# Patient Record
Sex: FEMALE | Race: WHITE | RURAL
[De-identification: names, ages, dates, MRNs, and addresses within clinical notes are randomized per-mention and may not be internally consistent; named-entity substitution may affect disease eponyms.]

---

## 2019-07-08 ENCOUNTER — OFFICE VISIT (OUTPATIENT)
Dept: INTERNAL MEDICINE CLINIC | Age: 19
End: 2019-07-08

## 2019-07-08 VITALS
HEIGHT: 65 IN | TEMPERATURE: 98.1 F | BODY MASS INDEX: 18.73 KG/M2 | SYSTOLIC BLOOD PRESSURE: 102 MMHG | WEIGHT: 112.4 LBS | RESPIRATION RATE: 14 BRPM | OXYGEN SATURATION: 98 % | DIASTOLIC BLOOD PRESSURE: 73 MMHG | HEART RATE: 104 BPM

## 2019-07-08 DIAGNOSIS — F41.8 ANXIETY WITH DEPRESSION: Primary | ICD-10-CM

## 2019-07-08 DIAGNOSIS — F33.0 DEPRESSION, MAJOR, RECURRENT, MILD (HCC): ICD-10-CM

## 2019-07-08 RX ORDER — PAROXETINE HYDROCHLORIDE 40 MG/1
40 TABLET, FILM COATED ORAL DAILY
Qty: 30 TAB | Refills: 0 | Status: SHIPPED | OUTPATIENT
Start: 2019-07-08 | End: 2019-08-13 | Stop reason: SDUPTHER

## 2019-07-08 RX ORDER — PAROXETINE HYDROCHLORIDE 40 MG/1
40 TABLET, FILM COATED ORAL DAILY
Refills: 1 | COMMUNITY
Start: 2019-06-08 | End: 2019-07-08 | Stop reason: SDUPTHER

## 2019-07-08 RX ORDER — LEVONORGESTREL AND ETHINYL ESTRADIOL 0.1-0.02MG
KIT ORAL DAILY
COMMUNITY
Start: 2019-05-09

## 2019-07-08 NOTE — PROGRESS NOTES
Chief Complaint   Patient presents with    \[facility] [place] [facility]\"" Care    Depression     MEDICATION REFILL. PATIENT DENIES THERAPIST. DR. Shanda Beckham ([address] [place] [address])     HISTORY OF PRESENT ILLNESS  Kala Osgood is a 19 y.o. female who recently moved from [place], [place]. Her parents live here, who she said are supportive. She is an only child. Has friends and has a boyfriend. Worked as a CNA in the past. Looking currently for employment. Says it helps her symptoms to work. Depression  Has had depression since she was a teenager. Stated she also has anxiety. Been on Paxil 40 mg PO daily, which has helped her anxiety but she said \" I don't think it helps the depression. \" Requesting refill. Was being followed by Warrick Duverney ([address] [place] [address], [place].) Signed waiver form to obtain records. She said her water intake is good, but she drinks about 2-3 cups coffee daily. Tries to exercise 30 min day. Denied smoking, ETOH or recreational drugs. Said she has suicidal thoughts 3x per week. Denied currently having suicidal thoughts or being actively suicidal. Does not have a plan. Said she gets \" irritable\" and recently her life has been affected by her symptoms.      No Known Allergies  Patient Active Problem List   Diagnosis Code    Depression F32.9    Anxiety F41.9     Family History   Problem Relation Age of Onset    Cancer Mother         breast; anxiety and depression     Social History     Socioeconomic History    Marital status: SINGLE     Spouse name: Not on file    Number of children: Not on file    Years of education: Not on file    Highest education level: Not on file   Occupational History    Not on file   Social Needs    Financial resource strain: Not on file    Food insecurity:     Worry: Not on file     Inability: Not on file    Transportation needs:     Medical: Not on file     Non-medical: Not on file   Tobacco Use    Smoking status: Never Smoker    Smokeless tobacco: Never Used   Substance and Sexual Activity    Alcohol use: Never     Frequency: Never    Drug use: Not Currently    Sexual activity: Yes     Partners: Male   Lifestyle    Physical activity:     Days per week: Not on file     Minutes per session: Not on file    Stress: Not on file   Relationships    Social connections:     Talks on phone: Not on file     Gets together: Not on file     Attends [employer] service: Not on file     Active member of club or organization: Not on file     Attends meetings of clubs or organizations: Not on file     Relationship status: Not on file    Intimate partner violence:     Fear of current or ex partner: Not on file     Emotionally abused: Not on file     Physically abused: Not on file     Forced sexual activity: Not on file   Other Topics Concern    Not on file   Social History Narrative    Not on file     3 most recent PHQ Screens 7/9/2019   Little interest or pleasure in doing things Several days   Feeling down, depressed, irritable, or hopeless Several days   Total Score PHQ 2 2   Trouble falling or staying asleep, or sleeping too much Not at all   Feeling tired or having little energy Several days   Poor appetite, weight loss, or overeating Not at all   Feeling bad about yourself - or that you are a failure or have let yourself or your family down Not at all   Trouble concentrating on things such as school, work, reading, or watching TV Not at all   Moving or speaking so slowly that other people could have noticed; or the opposite being so fidgety that others notice Not at all   Thoughts of being better off dead, or hurting yourself in some way More than half the days   PHQ 9 Score 5     Review of Systems   Constitutional: Positive for malaise/fatigue. Negative for chills, diaphoresis, fever and weight loss. HENT: Negative for congestion, sinus pain and sore throat. Eyes: Negative for blurred vision.    Respiratory: Negative for cough, shortness of breath and wheezing. Cardiovascular: Negative for chest pain, palpitations and leg swelling. Gastrointestinal: Negative for abdominal pain, heartburn, nausea and vomiting. Musculoskeletal: Negative for back pain, joint pain and myalgias. Skin: Negative for rash. Neurological: Negative for dizziness, tingling, loss of consciousness, weakness and headaches. Endo/Heme/Allergies: Negative for environmental allergies. Psychiatric/Behavioral: Positive for depression. Negative for suicidal ideas (occasional but not currently. ). The patient is not nervous/anxious. /73 (BP 1 Location: Left arm, BP Patient Position: Sitting)   Pulse (!) 104   Temp 98.1 °F (36.7 °C) (Oral)   Resp 14   Ht 5' 5\" (1.651 m)   Wt 112 lb 6.4 oz (51 kg)   LMP 07/07/2019 (Exact Date)   SpO2 98%   BMI 18.70 kg/m²   Physical Exam   Constitutional: She is oriented to person, place, and time. She appears well-developed and well-nourished. No distress. HENT:   Mouth/Throat: Oropharynx is clear and moist.   Eyes: Pupils are equal, round, and reactive to light. Conjunctivae and EOM are normal.   Cardiovascular: Normal rate, regular rhythm and normal heart sounds. Exam reveals no gallop and no friction rub. No murmur heard. Pulmonary/Chest: Effort normal and breath sounds normal. No respiratory distress. She has no wheezes. She has no rales. Abdominal: Soft. Musculoskeletal: Normal range of motion. Neurological: She is alert and oriented to person, place, and time. Skin: Skin is warm and dry. Psychiatric: Her behavior is normal.   Flat affect, yet smiles occasional and makes eye contact. Well groomed. Suicidal ideas 3x week, not currently suicidal and does not have a plan. No thoughts of self harm currently. ASSESSMENT and PLAN    ICD-10-CM ICD-9-CM    1. Anxiety with depression F41.8 300.4 REFERRAL TO PSYCHIATRY     Encounter Diagnoses   Name Primary?     Anxiety with depression Yes Orders Placed This Encounter    REFERRAL TO PSYCHIATRY    DISCONTD: PARoxetine (PAXIL) 40 mg tablet    AVIANE 0.1-20 mg-mcg tab    PARoxetine (PAXIL) 40 mg tablet     Follow-up and Dispositions    · Return in about 1 month (around 8/5/2019), or if symptoms worsen or fail to improve. Had pt call (while in our office) Ms. Tinocorenato Neftali VALDIVIA in [facility] for an appt. They scheduled her for September 5, 2019. I personally called and spoke with Kayden Ruelas, pt . Explained that this pt is having suicidal thoughts 3x week, but not currently. He said most of the providers were off today, but will have Ms. Petit call tomorrow to see if we can fit pt in sooner. Advised patient that is she becomes suicidal that she should call 911 or go to the nearest ER.     the following changes in treatment are made: refilled Paxil 40 mg for 30 days. reviewed diet, exercise and weight control.     Sukumar Bowers NP

## 2019-07-08 NOTE — PROGRESS NOTES
Health Maintenance Due   Topic Date Due    DTaP/Tdap/Td series (1 - Tdap) 08/22/2005    HPV Age 9Y-34Y (1 - Female 3-dose series) 08/22/2013

## 2019-07-08 NOTE — PATIENT INSTRUCTIONS
Learning About Anxiety Disorders What are anxiety disorders? Anxiety disorders are a type of medical problem. They cause severe anxiety. When you feel anxious, you feel that something bad is about to happen. This feeling interferes with your life. These disorders include: · Generalized anxiety disorder. You feel worried and stressed about many everyday events and activities. This goes on for several months and disrupts your life on most days. · Panic disorder. You have repeated panic attacks. A panic attack is a sudden, intense fear or anxiety. It may make you feel short of breath. Your heart may pound. · Social anxiety disorder. You feel very anxious about what you will say or do in front of people. For example, you may be scared to talk or eat in public. This problem affects your daily life. · Phobias. You are very scared of a specific object, situation, or activity. For example, you may fear spiders, high places, or small spaces. What are the symptoms? Generalized anxiety disorder Symptoms may include: · Feeling worried and stressed about many things almost every day. · Feeling tired or irritable. You may have a hard time concentrating. · Having headaches or muscle aches. · Having a hard time getting to sleep or staying asleep. Panic disorder You may have repeated panic attacks when there is no reason for feeling afraid. You may change your daily activities because you worry that you will have another attack. Symptoms may include: 
· Intense fear, terror, or anxiety. · Trouble breathing or very fast breathing. · Chest pain or tightness. · A heartbeat that races or is not regular. Social anxiety disorder Symptoms may include: · Fear about a social situation, such as eating in front of others or speaking in public. You may worry a lot. Or you may be afraid that something bad will happen. · Anxiety that can cause you to blush, sweat, and feel shaky. · A heartbeat that is faster than normal. 
· A hard time focusing. Phobias Symptoms may include: · More fear than most people of being around an object, being in a situation, or doing an activity. You might also be stressed about the chance of being around the thing you fear. · Worry about losing control, panicking, fainting, or having physical symptoms like a faster heartbeat when you are around the situation or object. How are these disorders treated? Anxiety disorders can be treated with medicines or counseling. A combination of both may be used. Medicines may include: · Antidepressants. These may help your symptoms by keeping chemicals in your brain in balance. · Benzodiazepines. These may give you short-term relief of your symptoms. Some people use cognitive-behavioral therapy. A therapist helps you learn to change stressful or bad thoughts into helpful thoughts. Lead a healthy lifestyle A healthy lifestyle may help you feel better. · Get at least 30 minutes of exercise on most days of the week. Walking is a good choice. · Eat a healthy diet. Include fruits, vegetables, lean proteins, and whole grains in your diet each day. · Try to go to bed at the same time every night. Try for 8 hours of sleep a night. · Find ways to manage stress. Try relaxation exercises. · Avoid alcohol and illegal drugs. Follow-up care is a key part of your treatment and safety. Be sure to make and go to all appointments, and call your doctor if you are having problems. It's also a good idea to know your test results and keep a list of the medicines you take. Where can you learn more? Go to http://ian-cleve.info/. Enter G960 in the search box to learn more about \"Learning About Anxiety Disorders. \" Current as of: September 11, 2018 Content Version: 11.9 © 0230-5965 [employer].  Care instructions adapted under license by [employer] (which disclaims liability or warranty for this information). If you have questions about a medical condition or this instruction, always ask your healthcare professional. [address] any warranty or liability for your use of this information. Recovering From Depression: Care Instructions Your Care Instructions Taking good care of yourself is important as you recover from depression. In time, your symptoms will fade as your treatment takes hold. Do not give up. Instead, focus your energy on getting better. Your mood will improve. It just takes some time. Focus on things that can help you feel better, such as being with friends and family, eating well, and getting enough rest. But take things slowly. Do not do too much too soon. You will begin to feel better gradually. Follow-up care is a key part of your treatment and safety. Be sure to make and go to all appointments, and call your doctor if you are having problems. It's also a good idea to know your test results and keep a list of the medicines you take. How can you care for yourself at home? Be realistic · If you have a large task to do, break it up into smaller steps you can handle, and just do what you can. · You may want to put off important decisions until your depression has lifted. If you have plans that will have a major impact on your life, such as marriage, divorce, or a job change, try to wait a bit. Talk it over with friends and loved ones who can help you look at the overall picture first. 
· Reaching out to people for help is important. Do not isolate yourself. Let your family and friends help you. Find someone you can trust and confide in, and talk to that person. · Be patient, and be kind to yourself. Remember that depression is not your fault and is not something you can overcome with willpower alone. Treatment is necessary for depression, just like for any other illness. Feeling better takes time, and your mood will improve little by little. Stay active · Stay busy and get outside. Take a walk, or try some other light exercise. · Talk with your doctor about an exercise program. Exercise can help with mild depression. · Go to a movie or concert. Take part in a [employer] activity or other social gathering. Go to a ball game. · Ask a friend to have dinner with you. Take care of yourself · Eat a balanced diet with plenty of fresh fruits and vegetables, whole grains, and lean protein. If you have lost your appetite, eat small snacks rather than large meals. · Avoid drinking alcohol or using illegal drugs. Do not take medicines that have not been prescribed for you. They may interfere with medicines you may be taking for depression, or they may make your depression worse. · Take your medicines exactly as they are prescribed. You may start to feel better within 1 to 3 weeks of taking antidepressant medicine. But it can take as many as 6 to 8 weeks to see more improvement. If you have questions or concerns about your medicines, or if you do not notice any improvement by 3 weeks, talk to your doctor. · If you have any side effects from your medicine, tell your doctor. Antidepressants can make you feel tired, dizzy, or nervous. Some people have dry mouth, constipation, headaches, sexual problems, or diarrhea. Many of these side effects are mild and will go away on their own after you have been taking the medicine for a few weeks. Some may last longer. Talk to your doctor if side effects are bothering you too much. You might be able to try a different medicine. · Get enough sleep. If you have problems sleeping: 
? Go to bed at the same time every night, and get up at the same time every morning. ? Keep your bedroom dark and quiet. ? Do not exercise after 5:00 p.m. ? Avoid drinks with caffeine after 5:00 p.m. · Avoid sleeping pills unless they are prescribed by the doctor treating your depression.  Sleeping pills may make you groggy during the day, and they may interact with other medicine you are taking. · If you have any other illnesses, such as diabetes, heart disease, or high blood pressure, make sure to continue with your treatment. Tell your doctor about all of the medicines you take, including those with or without a prescription. · Keep the numbers for these national suicide hotlines: 1-879-029-TALK (7-114.150.9340) and 2-185-LAYENIH (9-251.635.4074). If you or someone you know talks about suicide or feeling hopeless, get help right away. When should you call for help? Call 911 anytime you think you may need emergency care. For example, call if: 
  · You feel like hurting yourself or someone else.  
  · Someone you know has depression and is about to attempt or is attempting suicide.  
[facility] your doctor now or seek immediate medical care if: 
  · You hear voices.  
  · Someone you know has depression and: 
? Starts to give away his or her possessions. ? Uses illegal drugs or drinks alcohol heavily. ? Talks or writes about death, including writing suicide notes or talking about guns, knives, or pills. ? Starts to spend a lot of time alone. ? Acts very aggressively or suddenly appears calm.  
 Watch closely for changes in your health, and be sure to contact your doctor if: 
  · You do not get better as expected. Where can you learn more? Go to http://ian-cleve.info/. Enter O628 in the search box to learn more about \"Recovering From Depression: Care Instructions. \" Current as of: September 11, 2018 Content Version: 11.9 © 4047-6771 [employer], [employer]orporated. Care instructions adapted under license by [employer] (which disclaims liability or warranty for this information). If you have questions about a medical condition or this instruction, always ask your healthcare professional. [address] any warranty or liability for your use of this information. Depression Treatment: Care Instructions Your Care Instructions Depression is a condition that affects the way you feel, think, and act. It causes symptoms such as low energy, loss of interest in daily activities, and sadness or grouchiness that goes on for a long time. Depression is very common and affects men and women of all ages. Depression is a medical illness caused by changes in the natural chemicals in your brain. It is not a character flaw, and it does not mean that you are a bad or weak person. It does not mean that you are going crazy. It is important to know that depression can be treated. Medicines, counseling, and self-care can all help. Many people do not get help because they are embarrassed or think that they will get over the depression on their own. But some people do not get better without treatment. Follow-up care is a key part of your treatment and safety. Be sure to make and go to all appointments, and call your doctor if you are having problems. It's also a good idea to know your test results and keep a list of the medicines you take. How can you care for yourself at home? Learn about antidepressant medicines Antidepressant medicines can improve or end the symptoms of depression. You may need to take the medicine for at least 6 months, and often longer. Keep taking your medicine even if you feel better. If you stop taking it too soon, your symptoms may come back or get worse. You may start to feel better within 1 to 3 weeks of taking antidepressant medicine. But it can take as many as 6 to 8 weeks to see more improvement. Talk to your doctor if you have problems with your medicine or if you do not notice any improvement after 3 weeks. Antidepressants can make you feel tired, dizzy, or nervous. Some people have dry mouth, constipation, headaches, sexual problems, an upset stomach, or diarrhea.  Many of these side effects are mild and go away on their own after you take the medicine for a few weeks. Some may last longer. Talk to your doctor if side effects bother you too much. You might be able to try a different medicine. If you are pregnant or breastfeeding, talk to your doctor about what medicines you can take. Learn about counseling In many cases, counseling can work as well as medicines to treat mild to moderate depression. Counseling is done by licensed mental health providers, such as psychologists, social workers, and some types of nurses. It can be done in one-on-one sessions or in a group setting. Many people find group sessions helpful. Cognitive-behavioral therapy is a type of counseling. In this treatment therapy, you learn how to see and change unhelpful thinking styles that may be adding to your depression. Counseling and medicines often work well when used together. To manage depression · Be physically active. Getting 30 minutes of exercise each day is good for your body and your mind. Begin slowly if it is hard for you to get started. If you already exercise, keep it up. · Plan something pleasant for yourself every day. Include activities that you have enjoyed in the past. 
· Get enough sleep. Talk to your doctor if you have problems sleeping. · Eat a balanced diet. If you do not feel hungry, eat small snacks rather than large meals. · Do not drink alcohol, use illegal drugs, or take medicines that your doctor has not prescribed for you. They may interfere with your treatment. · Spend time with family and friends. It may help to speak openly about your depression with people you trust. 
· Take your medicines exactly as prescribed. Call your doctor if you think you are having a problem with your medicine. · Do not make major life decisions while you are depressed. Depression may change the way you think. You will be able to make better decisions after you feel better. · Think positively. Challenge negative thoughts with statements such as \"I am hopeful\"; \"Things will get better\"; and \"I can ask for the help I need. \" Write down these statements and read them often, even if you don't believe them yet. · Be patient with yourself. It took time for your depression to develop, and it will take time for your symptoms to improve. Do not take on too much or be too hard on yourself. · Learn all you can about depression from written and online materials. · Check out behavioral health classes to learn more about dealing with depression. · Keep the numbers for these national suicide hotlines: 1-278-315-TALK (1-973.584.8338) and 9-754-GNSWDPM (8-815.160.2382). If you or someone you know talks about suicide or feeling hopeless, get help right away. When should you call for help? Call 911 anytime you think you may need emergency care. For example, call if: 
  · You feel you cannot stop from hurting yourself or someone else.  
[facility] your doctor now or seek immediate medical care if: 
  · You hear voices.  
  · You feel much more depressed.  
 Watch closely for changes in your health, and be sure to contact your doctor if: 
  · You are having problems with your depression medicine.  
  · You are not getting better as expected. Where can you learn more? Go to http://ian-cleve.info/. Enter J544 in the search box to learn more about \"Depression Treatment: Care Instructions. \" Current as of: September 11, 2018 Content Version: 11.9 © 7385-5342 Healthwise, Incorporated. Care instructions adapted under license by [employer] (which disclaims liability or warranty for this information). If you have questions about a medical condition or this instruction, always ask your healthcare professional. [address] any warranty or liability for your use of this information. Eating Healthy Foods: Care Instructions Your Care Instructions Eating healthy foods can help lower your risk for disease. Healthy food gives you energy and keeps your heart strong, your brain active, your muscles working, and your bones strong. A healthy diet includes a variety of foods from the basic food groups: grains, vegetables, fruits, milk and milk products, and meat and beans. Some people may eat more of their favorite foods from only one food group and, as a result, miss getting the nutrients they need. So, it is important to pay attention not only to what you eat but also to what you are missing from your diet. You can eat a healthy, balanced diet by making a few small changes. Follow-up care is a key part of your treatment and safety. Be sure to make and go to all appointments, and call your doctor if you are having problems. It's also a good idea to know your test results and keep a list of the medicines you take. How can you care for yourself at home? Look at what you eat · Keep a food diary for a week or two and record everything you eat or drink. Track the number of servings you eat from each food group. · For a balanced diet every day, eat a variety of: 
? 6 or more ounce-equivalents of grains, such as cereals, breads, crackers, rice, or pasta, every day. An ounce-equivalent is 1 slice of bread, 1 cup of ready-to-eat cereal, or ½ cup of cooked rice, cooked pasta, or cooked cereal. 
? 2½ cups of vegetables, especially: § Dark-green vegetables such as broccoli and spinach. § Orange vegetables such as carrots and sweet potatoes. § Dry beans (such as [place] and kidney beans) and peas (such as lentils). ? 2 cups of fresh, frozen, or canned fruit. A small apple or 1 banana or orange equals 1 cup. ? 3 cups of nonfat or low-fat milk, yogurt, or other milk products. ? 5½ ounces of meat and beans, such as chicken, fish, lean meat, beans, nuts, and seeds.  One egg, 1 tablespoon of peanut butter, ½ ounce nuts or seeds, or ¼ cup of cooked beans equals 1 ounce of meat. · Learn how to read food labels for serving sizes and ingredients. Fast-food and convenience-food meals often contain few or no fruits or vegetables. Make sure you eat some fruits and vegetables to make the meal more nutritious. · Look at your food diary. For each food group, add up what you have eaten and then divide the total by the number of days. This will give you an idea of how much you are eating from each food group. See if you can find some ways to change your diet to make it more healthy. Start small · Do not try to make dramatic changes to your diet all at once. You might feel that you are missing out on your favorite foods and then be more likely to fail. · Start slowly, and gradually change your habits. Try some of the following: ? Use whole wheat bread instead of white bread. ? Use nonfat or low-fat milk instead of whole milk. ? Eat brown rice instead of white rice, and eat whole wheat pasta instead of white-flour pasta. ? Try low-fat cheeses and low-fat yogurt. ? Add more fruits and vegetables to meals and have them for snacks. ? Add lettuce, tomato, cucumber, and onion to sandwiches. ? Add fruit to yogurt and cereal. 
Enjoy food · You can still eat your favorite foods. You just may need to eat less of them. If your favorite foods are high in fat, salt, and sugar, limit how often you eat them, but do not cut them out entirely. · Eat a wide variety of foods. Make healthy choices when eating out · The type of restaurant you choose can help you make healthy choices. Even fast-food chains are now offering more low-fat or healthier choices on the menu. · Choose smaller portions, or take half of your meal home. · When eating out, try: ? A veggie pizza with a whole wheat crust or grilled chicken (instead of sausage or pepperoni). ? Pasta with roasted vegetables, grilled chicken, or marinara sauce instead of cream sauce. ? A vegetable wrap or grilled chicken wrap. ? Broiled or poached food instead of fried or breaded items. Make healthy choices easy · Buy packaged, prewashed, ready-to-eat fresh vegetables and fruits, such as baby carrots, salad mixes, and chopped or shredded broccoli and cauliflower. · Buy packaged, presliced fruits, such as melon or pineapple. · Choose 100% fruit or vegetable juice instead of soda. Limit juice intake to 4 to 6 oz (½ to ¾ cup) a day. · Blend low-fat yogurt, fruit juice, and canned or frozen fruit to make a smoothie for breakfast or a snack. Where can you learn more? Go to http://ian-cleve.info/. Enter T756 in the search box to learn more about \"Eating Healthy Foods: Care Instructions. \" Current as of: March 28, 2018 Content Version: 11.9 © 8603-1339 [employer]. Care instructions adapted under license by [employer] (which disclaims liability or warranty for this information). If you have questions about a medical condition or this instruction, always ask your healthcare professional. [place] [address] any warranty or liability for your use of this information. Learning About Mindfulness for Stress What are mindfulness and stress? Stress is what you feel when you have to handle more than you are used to. A lot of things can cause stress. You may feel stress when you go on a job interview, take a test, or run a race. This kind of short-term stress is normal and even useful. It can help you if you need to work hard or react quickly. Stress also can last a long time. Long-term stress is caused by stressful situations or events. Examples of long-term stress include long-term health problems, ongoing problems at work, and conflicts in your family. Long-term stress can harm your health. Mindfulness is a focus only on things happening in the present moment. It's a process of purposefully paying attention to and being aware of your surroundings, your emotions, your thoughts, and how your body feels. You are aware of these things, but you aren't judging these experiences as \"good\" or \"bad. \" Mindfulness can help you learn to calm your mind and body to help you cope with illness, pain, and stress. How does mindfulness help to relieve stress? Mindfulness can help quiet your mind and relax your body. Studies show that it can help some people sleep better, feel less anxious, and bring their blood pressure down. And it's been shown to help some people live and cope better with certain health problems like heart disease, depression, chronic pain, and cancer. How do you practice mindfulness? To be mindful is to pay attention, to be present, and to be accepting. · When you're mindful, you do just one thing and you pay close attention to that one thing. For example, you may sit quietly and notice your emotions or how your food tastes and smells. · When you're present, you focus on the things that are happening right now. You let go of your thoughts about the past and the future. When you dwell on the past or the future, you miss moments that can heal and strengthen you. You may miss moments like hearing a child laugh or seeing a friendly face when you think you're all alone. · When you're accepting, you don't  the present moment. Instead you accept your thoughts and feelings as they come. You can practice anytime, anywhere, and in any way you choose. You can practice in many ways. Here are a few ideas: · While doing your chores, like washing the dishes, let your mind focus on what's in your hand. What does the dish feel like? Is the water warm or cold? · Go outside and take a few deep breaths. What is the air like? Is it warm or cold? · When you can, take some time at the start of your day to sit alone and think. · Take a slow walk by yourself. Count your steps while you breathe in and out. · Try yoga breathing exercises, stretches, and poses to strengthen and relax your muscles. · At work, if you can, try to stop for a few moments each hour. Note how your body feels. Let yourself regroup and let your mind settle before you return to what you were doing. · If you struggle with anxiety or \"worry thoughts,\" imagine your mind as a blue tam and your worry thoughts as clouds. Now imagine those worry thoughts floating across your mind's tam. Just let them pass by as you watch. Follow-up care is a key part of your treatment and safety. Be sure to make and go to all appointments, and call your doctor if you are having problems. It's also a good idea to know your test results and keep a list of the medicines you take. Where can you learn more? Go to http://ian-cleve.info/. Enter I098 in the search box to learn more about \"Learning About Mindfulness for Stress. \" Current as of: June 28, 2018 Content Version: 11.9 © 3936-7643 [employer]. Care instructions adapted under license by [employer] (which disclaims liability or warranty for this information). If you have questions about a medical condition or this instruction, always ask your healthcare professional. [address] any warranty or liability for your use of this information.

## 2019-07-09 ENCOUNTER — TELEPHONE (OUTPATIENT)
Dept: INTERNAL MEDICINE CLINIC | Age: 19
End: 2019-07-09

## 2019-07-09 PROBLEM — F41.9 ANXIETY: Status: ACTIVE | Noted: 2019-07-09

## 2019-07-09 PROBLEM — F33.0 DEPRESSION, MAJOR, RECURRENT, MILD (HCC): Status: ACTIVE | Noted: 2019-07-09

## 2019-07-09 PROBLEM — F32.A DEPRESSION: Status: ACTIVE | Noted: 2019-07-09

## 2019-07-09 NOTE — TELEPHONE ENCOUNTER
Spoke with Ms. Anna Hickey NP. She said she does not have any more openings but she gave me the name of a new NP who may. Her name is Modesta Nguyen NP. Will call pt and relay message. Left message for pt to call back.

## 2019-07-10 ENCOUNTER — TELEPHONE (OUTPATIENT)
Dept: INTERNAL MEDICINE CLINIC | Age: 19
End: 2019-07-10

## 2019-07-10 NOTE — TELEPHONE ENCOUNTER
Patient called back. Said she is doing \" about the same. \"     Spoke with Ms. Ifeoma Abhinav yesterday and she gave me a # for a Psych NP Salomón Donnelly that may be able to see her sooner than September. Advised pt to call Ms. Gan and then let me know when her appt is.

## 2019-08-13 ENCOUNTER — TELEPHONE (OUTPATIENT)
Dept: INTERNAL MEDICINE CLINIC | Age: 19
End: 2019-08-13

## 2019-08-13 DIAGNOSIS — F33.0 DEPRESSION, MAJOR, RECURRENT, MILD (HCC): Primary | ICD-10-CM

## 2019-08-13 RX ORDER — PAROXETINE HYDROCHLORIDE 40 MG/1
TABLET, FILM COATED ORAL
Qty: 30 TAB | Refills: 0 | Status: SHIPPED | OUTPATIENT
Start: 2019-08-13 | End: 2019-09-17 | Stop reason: SDUPTHER

## 2019-08-13 RX ORDER — PAROXETINE HYDROCHLORIDE 40 MG/1
40 TABLET, FILM COATED ORAL DAILY
Qty: 30 TAB | Refills: 0 | Status: SHIPPED | OUTPATIENT
Start: 2019-08-13 | End: 2019-09-12

## 2019-08-13 NOTE — TELEPHONE ENCOUNTER
Requested Prescriptions     Pending Prescriptions Disp Refills    PARoxetine (PAXIL) 40 mg tablet 30 Tab 0     Sig: Take 1 Tab by mouth daily.  Indications: Anxiousness associated with Depression     Pt is completely out

## 2019-08-13 NOTE — TELEPHONE ENCOUNTER
Left message. Sent nurse Jazmín Wong a message to let pt know I refilled Paxil but need her to make a follow up to assess medication. She was a no show on August 5th, 2019. Has an appt with Psych provider [facility] on Sept. 5, 2019.

## 2019-09-17 ENCOUNTER — OFFICE VISIT (OUTPATIENT)
Dept: INTERNAL MEDICINE CLINIC | Age: 19
End: 2019-09-17

## 2019-09-17 VITALS
OXYGEN SATURATION: 96 % | BODY MASS INDEX: 20.16 KG/M2 | WEIGHT: 121 LBS | DIASTOLIC BLOOD PRESSURE: 72 MMHG | RESPIRATION RATE: 20 BRPM | HEART RATE: 86 BPM | TEMPERATURE: 98.1 F | HEIGHT: 65 IN | SYSTOLIC BLOOD PRESSURE: 108 MMHG

## 2019-09-17 DIAGNOSIS — R51.9 FREQUENT HEADACHES: ICD-10-CM

## 2019-09-17 DIAGNOSIS — R63.5 WEIGHT GAIN: ICD-10-CM

## 2019-09-17 DIAGNOSIS — F41.9 ANXIETY: Primary | ICD-10-CM

## 2019-09-17 DIAGNOSIS — Z00.00 EXAMINATION: ICD-10-CM

## 2019-09-17 DIAGNOSIS — N30.00 ACUTE CYSTITIS WITHOUT HEMATURIA: ICD-10-CM

## 2019-09-17 DIAGNOSIS — Z82.49 FAM HX-ISCHEM HEART DISEASE: ICD-10-CM

## 2019-09-17 PROBLEM — N39.0 UTI (URINARY TRACT INFECTION): Status: ACTIVE | Noted: 2019-09-17

## 2019-09-17 RX ORDER — PAROXETINE HYDROCHLORIDE 40 MG/1
40 TABLET, FILM COATED ORAL DAILY
Qty: 30 TAB | Refills: 0 | Status: SHIPPED | OUTPATIENT
Start: 2019-09-17 | End: 2019-10-17

## 2019-09-17 RX ORDER — PAROXETINE HYDROCHLORIDE 20 MG/1
20 TABLET, FILM COATED ORAL DAILY
Qty: 30 TAB | Refills: 0 | Status: SHIPPED | OUTPATIENT
Start: 2019-09-17 | End: 2019-10-17

## 2019-09-17 RX ORDER — PAROXETINE HYDROCHLORIDE 40 MG/1
40 TABLET, FILM COATED ORAL DAILY
Qty: 30 TAB | Refills: 0 | Status: SHIPPED | OUTPATIENT
Start: 2019-09-17 | End: 2019-09-17 | Stop reason: DRUGHIGH

## 2019-09-17 NOTE — PATIENT INSTRUCTIONS
Learning About Generalized Anxiety Disorder  What is generalized anxiety disorder? We all worry. It's a normal part of life. But when you have generalized anxiety disorder, you worry about lots of things and have a hard time stopping your worry. This worry or anxiety interferes with your relationships, work, and life. What causes it? The cause is not known. But it may be passed down through families. What are the symptoms? You may feel anxious or worry most days about things like work, relationships, health, or money. You may worry about things that are unlikely to happen. You find it hard to stop or control the worry. Because you worry a lot and try hard to stop worrying, you may feel restless, tired, tense, or cranky. You may also find it hard to think or sleep. And you may have headaches or an upset stomach. How is it diagnosed? Your doctor will ask about your health and how often you worry or feel anxious. He or she may ask about other symptoms, like whether you:  · Feel restless. · Feel tired. · Have a hard time thinking or feel that your mind goes blank. · Feel cranky. · Have tense muscles. · Have sleep problems. A physical exam and tests can help make sure that your symptoms aren't caused by a different condition, such as a thyroid problem. How is it treated? Counseling and medicine can both work to treat anxiety. The two are often used along with lifestyle changes. Cognitive-behavioral therapy (CBT) is a type of counseling that's used to help treat anxiety. In CBT, you learn how to notice and replace thoughts that make you feel worried. It also can help you learn how to relax when you worry. Medicines can help. These medicines are often also used for depression. Selective serotonin reuptake inhibitors (SSRIs) are often tried first. But there are other medicines that your doctor may use. You may need to try a few medicines to find one that works well.   Many people feel better by getting regular exercise, eating healthy meals, and getting good sleep. Mindfulness--focusing on things that happen in the present moment--also can help reduce your anxiety. What can you expect when you have it? Having anxiety can be upsetting. Some people might feel less worried and stressed after a couple of months of treatment. But for other people, it might take longer to feel better. Reaching out to people for help is important. Try not to isolate yourself. Let your family and friends help you. Find someone you can trust and confide in. Talk to that person. When you know what anxiety is--and how you can get help for it--you can start to learn new ways of thinking. This can help you cope and work through your anxiety. Follow-up care is a key part of your treatment and safety. Be sure to make and go to all appointments, and call your doctor if you are having problems. It's also a good idea to know your test results and keep a list of the medicines you take. Where can you learn more? Go to http://ian-cleve.info/. Enter G110 in the search box to learn more about \"Learning About Generalized Anxiety Disorder. \"  Current as of: September 11, 2018  Content Version: 12.1  © 9585-3597 Healthwise, Incorporated. Care instructions adapted under license by [employer] (which disclaims liability or warranty for this information). If you have questions about a medical condition or this instruction, always ask your healthcare professional. [place] [address] any warranty or liability for your use of this information. Obsessive-Compulsive Disorder: Care Instructions  Your Care Instructions    Obsessive-compulsive disorder (OCD) makes you have unwanted thoughts that occur over and over. For example, you might always wonder if the oven was turned off before you left home. To get rid of these thoughts, you may also develop a compulsion.  This is an action or ritual you perform again and again. You might check several times to make sure the oven is off. The action only makes you feel better for a short time. If you try to resist the urge to do it, you may feel great anxiety or have panic attacks. Counseling (also called therapy) can help you control your thoughts and actions. You may have one-on-one therapy or group therapy, or both. In group therapy, people with the same concerns share their feelings and give each other support. You also may have family therapy. Your loved ones can learn more about how to help you. Your doctor also may prescribe medicine, such as an antidepressant, to help with symptoms. Follow-up care is a key part of your treatment and safety. Be sure to make and go to all appointments, and call your doctor if you are having problems. It's also a good idea to know your test results and keep a list of the medicines you take. How can you care for yourself at home? · Take your medicines exactly as prescribed. Call your doctor if you think you are having a problem with your medicine. You will get more details on the specific medicines your doctor prescribes. · Go to your counseling sessions and follow-up appointments. · Do any homework or exercises that your therapist gives you to do at home. · Involve family members and loved ones in your treatment, especially if your doctor suggests you go to therapy together. · Try to reduce stress. This can help you cope. Some ways to do this include:  ? Taking slow, deep breaths. ? Soaking in a warm bath. ? Listening to soothing music. ? Taking a walk or doing some other exercise. ? Taking a yoga class. ? Having a massage or back rub. ? Drinking a warm, nonalcoholic, noncaffeinated beverage. · Eating a healthy, balanced diet and avoiding certain foods or drinks may also help you reduce stress. ? Avoid or limit caffeine. Coffee, tea, some soda pops, and chocolate contain caffeine.  If you drink a lot of caffeine, reduce the amount gradually. Suddenly stopping use of caffeine can cause headaches and make it hard for you to concentrate. ? Limit alcohol to 2 drinks a day for men and 1 drink a day for women. Too much alcohol can cause health problems. ? Make mealtimes calm and relaxed. Try not to skip meals or eat on the run. Skipping meals can make other stress-related symptoms worse, such as headaches or stomach tension. ? Avoid eating to relieve stress. Some people turn to food to comfort themselves when they are under stress. This can lead to overeating and guilt. If this is a problem for you, try to replace eating with other actions that relieve stress, like taking a walk, playing with a pet, or taking a bath. When should you call for help? Call 911 anytime you think you may need emergency care. For example, call if:    · You feel you cannot stop from hurting yourself or someone else.   [facility] your doctor now or seek immediate medical care if:    · A person with OCD mentions suicide. If a suicide threat seems real, with a specific plan and a way to carry it out, you should stay with the person, or ask someone you trust to stay with the person, until you get help.    Watch closely for changes in your health, and be sure to contact your doctor if:    · Your unwanted thoughts or repeated actions and rituals upset your daily activities.     · Your symptoms of OCD are new or different from those you had before. Where can you learn more? Go to http://ian-cleve.info/. Enter M712 in the search box to learn more about \"Obsessive-Compulsive Disorder: Care Instructions. \"  Current as of: September 11, 2018  Content Version: 12.1  © 9248-2193 Healthwise, Incorporated. Care instructions adapted under license by [employer] (which disclaims liability or warranty for this information).  If you have questions about a medical condition or this instruction, always ask your healthcare professional. [employer], [facility] disclaims any warranty or liability for your use of this information. Acne: Care Instructions  Your Care Instructions  Acne is a skin problem that shows up as blackheads, whiteheads, and pimples. It most often affects the face, neck, and upper body. Acne occurs when oil and dead skin cells clog the skin's pores. Acne usually starts during the teen years and often lasts into adulthood. Gentle cleansing every day controls most mild acne. If home treatment does not work, your doctor may prescribe creams, antibiotics, or a stronger medicine called isotretinoin. Sometimes birth control pills help women who have monthly acne flare-ups. Follow-up care is a key part of your treatment and safety. Be sure to make and go to all appointments, and call your doctor if you are having problems. It's also a good idea to know your test results and keep a list of the medicines you take. How can you care for yourself at home? · Gently wash your face 1 or 2 times a day with warm (not hot) water and a mild soap or cleanser. Always rinse well. · Use an over-the-counter lotion or gel that contains benzoyl peroxide. Start with a small amount of 2.5% benzoyl peroxide and increase the strength as needed. Benzoyl peroxide works well for acne, but you may need to use it for up to 2 months before your acne starts to improve. · Apply acne cream, lotion, or gel to all the places you get pimples, blackheads, or whiteheads, not just where you have them now. Follow the instructions carefully. If your skin gets too dry and scaly or red and sore, reduce the amount. For the best results, apply medicines as directed. Try not to miss doses. · Do not squeeze or pick pimples and blackheads. This can cause infection and scarring. · Use only oil-free makeup, sunscreen, and other skin care products that will not clog your pores. · Wash your hair every day, and try to keep it off your face and shoulders.  Consider pinning it back or cutting it short. When should you call for help? Watch closely for changes in your health, and be sure to contact your doctor if:    · You have tried home treatment for 6 to 8 weeks and your acne is not better or gets worse. Your doctor may need to add to or change your treatment.     · Your pimples become large and hard or filled with fluid.     · Scars form after pimples heal.     · You feel sad or hopeless, lack energy, or have other signs of depression while you are taking the prescription medicine isotretinoin.     · You start to have other symptoms, such as facial hair growth in women or bone and muscle pain. Where can you learn more? Go to http://ian-cleve.info/. Enter V108 in the search box to learn more about \"Acne: Care Instructions. \"  Current as of: April 1, 2019  Content Version: 12.1  © 3083-4273 [employer]. Care instructions adapted under license by [employer] (which disclaims liability or warranty for this information). If you have questions about a medical condition or this instruction, always ask your healthcare professional. [address] any warranty or liability for your use of this information. Acne Medicine: Care Instructions  Your Care Instructions    Medicines can help acne. They can clean skin pores, kill germs, and reduce skin oil. And they can reduce the effects of hormones. The type of medicine you take depends on the type of acne you have. The best treatment often is a mix of medicines. For example, you might take pills and put medicine on your skin. Common acne medicines include:  · Benzoyl peroxide. This includes Benzac or Brevoxyl. · Salicylic acid. This includes Propa pH or Stridex. · Retinoid medicines you put on your skin. These include adapalene (Differin) and tretinoin (Retin-A). · Antibiotic pills or ointments. These include erythromycin and tetracycline.   · Some birth control pills. Antibiotics for acne can cause side effects. They include yeast infections (in women) and diarrhea. Let your doctor know if you have side effects. Tell your doctor if you are breastfeeding or if you're pregnant or think you might get pregnant. Some of these medicines are not safe to take when you are pregnant or breastfeeding. Women who take some medicines need to use birth control. Follow-up care is a key part of your treatment and safety. Be sure to make and go to all appointments, and call your doctor if you are having problems. It's also a good idea to know your test results and keep a list of the medicines you take. How can you care for yourself at home? · Take your medicines exactly as prescribed. Call your doctor if you think you are having a problem with your medicine. You will get more details on the specific medicines your doctor prescribes. When should you call for help? Call your doctor now or seek immediate medical care if:    · You have signs of an infection, such as:  ? Increased pain, swelling, warmth, and redness. ? Red streaks leading from the affected area. ? Pus draining from the area. ? A fever.    Watch closely for changes in your health, and be sure to contact your doctor if:    · You think you may be having a problem with the medicine.     · You do not get better as expected. Where can you learn more? Go to http://ian-cleve.info/. Enter C526 in the search box to learn more about \"Acne Medicine: Care Instructions. \"  Current as of: April 1, 2019  Content Version: 12.1  © 2111-8243 Healthwise, Incorporated. Care instructions adapted under license by [employer] (which disclaims liability or warranty for this information). If you have questions about a medical condition or this instruction, always ask your healthcare professional. [address] any warranty or liability for your use of this information.     Antiacne Antibacterial (On the skin)   Treats severe acne. Brand Name(s): Acne Medication 10, Acne Medication 5, Acne-10, Acne-5, Avar LS Cleanser, Azelex, BPO Foaming Cloths, BPO-10, BPO-5, Benzac AC, Benzac AC Wash, Benzac W Wash, BenzePro Foaming Cloths, Benzoyl Peroxide 10 Gel, Clean & Clear Advantage 3 in 1 Exfoliating Cleanser   There may be other brand names for this medicine. When This Medicine Should Not Be Used: You should not use this medicine if you have had an allergic reaction to any ingredients. How to Use This Medicine:   Lotion, Liquid, Cream, Gel/Jelly, Soap, Pad  · Your doctor will tell you how much medicine to use. Do not use more than directed. · Apply a thin layer of this medicine to your skin and rub it in gently. · If you are using the lotion form of this medicine, shake it well just before each use. · If you are using the pledget form, leave the pledget applicator in its foil pouch until you are ready to use the medicine. Use each pledget only one time and throw it away after use. · Use this medicine only on your skin. Rinse it off right away if it gets on a cut or scrape. Do not get the medicine in your eyes, nose, or mouth. If a dose is missed:   · Take a dose as soon as you remember. If it is almost time for your next dose, wait until then and take a regular dose. Do not take extra medicine to make up for a missed dose. How to Store and Dispose of This Medicine:   · Store the medicine in a closed container at room temperature, away from heat, moisture, and direct light. · Ask your pharmacist or doctor how to dispose of the medicine container and any leftover or  medicine. · Keep all medicine out of the reach of children. Never share your medicine with anyone. Drugs and Foods to Avoid:   Ask your doctor or pharmacist before using any other medicine, including over-the-counter medicines, vitamins, and herbal products.   · Do not put cosmetics or skin care products on the treated skin. Warnings While Using This Medicine:   · Make sure your doctor knows if you are pregnant or breastfeeding. Tell your doctor if you have problems with your digestive tract. · Call your doctor if your symptoms do not improve or if they get worse. · This medicine can cause diarrhea. Call your doctor if the diarrhea becomes severe, does not stop, or is bloody. Do not take any medicine to stop diarrhea until you have talked to your doctor. Diarrhea can occur 2 months or more after you stop taking this medicine. · Tell any doctor or dentist who treats you that you are using this medicine. You may need to stop using this medicine several days before you have surgery or medical tests. Possible Side Effects While Using This Medicine:   Call your doctor right away if you notice any of these side effects:  · Diarrhea that may contain blood. · Redness or peeling of treated skin areas. · Worsened acne or symptoms of new skin infection. If you notice these less serious side effects, talk with your doctor:   · Burning, itching, or dry skin. · Oily skin. · Stomach pain or discomfort. If you notice other side effects that you think are caused by this medicine, tell your doctor. Call your doctor for medical advice about side effects. You may report side effects to FDA at 0-657-FDA-1630  © 2017 [facility] Information is for End User's use only and may not be sold, redistributed or otherwise used for commercial purposes. The above information is an  only. It is not intended as medical advice for individual conditions or treatments. Talk to your doctor, nurse or pharmacist before following any medical regimen to see if it is safe and effective for you.

## 2019-09-17 NOTE — PROGRESS NOTES
Kellen Jones is a 22 y.o. female and presents with   Chief Complaint   Patient presents with    Weight Gain    Alopecia     HPI:  Here for first follow up after starting Fluoxetine 7/8/19. Patient was supposed to follow up in 1 month and did not follow up with psych Dalia Fraga NP) either. Been feeling anxious lately. Stated she has gained weight and needs to reschedule an appt with psych. Stated she has not doing anything different lately. Stated her hair is thinning. Worrying about driving on interstate  Stated she has Paranoia and OCD thoughts. At first felt better, but now still worrying. Left work with intense anxiety. Feels like there is something else going on   Not sexually active since May  Does not currently have a  boyfriend  No thyroid issues  in past and not in the family  Works at [employer] in [facility] [place]    Past Medical History:   Diagnosis Date    Depression      No past surgical history on file.     Social History     Socioeconomic History    Marital status: SINGLE     Spouse name: Not on file    Number of children: Not on file    Years of education: Not on file    Highest education level: Not on file   Occupational History    Not on file   Social Needs    Financial resource strain: Not on file    Food insecurity:     Worry: Not on file     Inability: Not on file    Transportation needs:     Medical: Not on file     Non-medical: Not on file   Tobacco Use    Smoking status: Never Smoker    Smokeless tobacco: Never Used   Substance and Sexual Activity    Alcohol use: Never     Frequency: Never    Drug use: Not Currently    Sexual activity: Yes     Partners: Male   Lifestyle    Physical activity:     Days per week: Not on file     Minutes per session: Not on file    Stress: Not on file   Relationships    Social connections:     Talks on phone: Not on file     Gets together: Not on file     Attends Rastafari service: Not on file     Active member of club or organization: Not on file     Attends meetings of clubs or organizations: Not on file     Relationship status: Not on file    Intimate partner violence:     Fear of current or ex partner: Not on file     Emotionally abused: Not on file     Physically abused: Not on file     Forced sexual activity: Not on file   Other Topics Concern    Not on file   Social History Narrative    Not on file     No Known Allergies    Patient Active Problem List   Diagnosis Code    Depression F32.9    Anxiety F41.9    Depression, major, recurrent, mild (HCC) F33.0     Current Outpatient Medications   Medication Sig    PARoxetine (PAXIL) 40 mg tablet take 1 tablet by mouth once daily    AVIANE 0.1-20 mg-mcg tab Take  by mouth daily. No current facility-administered medications for this visit. Review of Systems:  Review of Systems   Constitutional: Positive for malaise/fatigue. Negative for chills and fever. HENT: Negative for ear pain, sinus pain and sore throat. Eyes: Negative for blurred vision and double vision. Respiratory: Negative for cough, shortness of breath and wheezing. Cardiovascular: Negative for chest pain and palpitations. Gastrointestinal: Negative for abdominal pain, constipation, diarrhea, nausea and vomiting. Genitourinary: Negative for dysuria, frequency and urgency. Skin: Negative for itching and rash. Neurological: Positive for headaches. Negative for dizziness. Psychiatric/Behavioral: Positive for depression and suicidal ideas (not currently). Negative for hallucinations, memory loss and substance abuse. The patient is nervous/anxious and has insomnia.       Physical Examination:  /72 (BP 1 Location: Left arm, BP Patient Position: At rest)   Pulse 86   Temp 98.1 °F (36.7 °C) (Oral)   Resp 20   Ht 5' 5\" (1.651 m)   Wt 121 lb (54.9 kg)   SpO2 96%   BMI 20.14 kg/m²     Physical Exam  WD WN female NAD  Heart RRR without murmers clicks or rubs  Lungs CTA  Abdo soft nontender  Ext no edema    Assessment/Plan:  Differential diagnosis and treatment options reviewed with patient who is in agreement with treatment plan as outlined below. ICD-10-CM ICD-9-CM    1. Anxiety F41.9 300.00 REFERRAL TO PSYCHIATRY      [facility] 3RD GENERATION      PARoxetine (PAXIL) 40 mg tablet      PARoxetine (PAXIL) 20 mg tablet   2. Acute cystitis without hematuria N30.00 595.0 URINALYSIS W/ RFLX MICROSCOPIC      DISCONTINUED: PARoxetine (PAXIL) 40 mg tablet   3. Weight gain R63.5 783.1 TSH 3RD GENERATION   4. Examination H38.96 M61.8 METABOLIC PANEL, COMPREHENSIVE      CBC WITH AUTOMATED DIFF      HEMOGLOBIN A1C WITH EAG      IRON PROFILE   5. Frequent headaches R51 784.0 MAGNESIUM   6. Fam hx-ischem heart disease Z82.49 V17.3 LIPID PANEL     1. Anxiety  - REFERRAL TO PSYCHIATRY-  Increased Paroxetine to 60mg daily  - TSH 3RD GENERATION; Future  - PARoxetine (PAXIL) 40 mg tablet; Take 1 Tab by mouth daily for 30 days. Dispense: 30 Tab; Refill: 0  - PARoxetine (PAXIL) 20 mg tablet; Take 1 Tab by mouth daily for 30 days. Dispense: 30 Tab; Refill: 0    2. Weight gain  -Rule out metabolic causes  - TSH 3RD GENERATION; Future    3. Acute cystitis without hematuria  -Rule out UTI  - URINALYSIS W/ RFLX MICROSCOPIC; Future     4. Examination  -Pending  - METABOLIC PANEL, COMPREHENSIVE; Future  - CBC WITH AUTOMATED DIFF; Future  - HEMOGLOBIN A1C WITH EAG; Future  - IRON PROFILE; Future    5. Frequent headaches  -Pending  - MAGNESIUM; Future    6. Fam hx-ischem heart disease  -Pending  - LIPID PANEL; FutureThere are no diagnoses linked to this encounter. Strongly encouraged patient to make an appt with psychiatrist to further evaluate anxiety and diagnosis. Verbal and written instructions (see AVS) provided. Patient expresses understanding and agreement of diagnosis and treatment plan.     Follow-up and Dispositions    · Return in about 8 weeks (around 11/12/2019), or if symptoms worsen or fail to improve, for follow up.        Henry Diamond, NP

## 2019-10-19 LAB
ALBUMIN SERPL-MCNC: 4.5 G/DL (ref 3.5–5.5)
ALBUMIN/GLOB SERPL: 1.9 {RATIO} (ref 1.2–2.2)
ALP SERPL-CCNC: 49 IU/L (ref 39–117)
ALT SERPL-CCNC: 10 IU/L (ref 0–32)
APPEARANCE UR: CLEAR
AST SERPL-CCNC: 12 IU/L (ref 0–40)
BASOPHILS # BLD AUTO: 0 X10E3/UL (ref 0–0.2)
BASOPHILS NFR BLD AUTO: 0 %
BILIRUB SERPL-MCNC: 0.9 MG/DL (ref 0–1.2)
BILIRUB UR QL STRIP: NEGATIVE
BUN SERPL-MCNC: 9 MG/DL (ref 6–20)
BUN/CREAT SERPL: 13 (ref 9–23)
CALCIUM SERPL-MCNC: 9.5 MG/DL (ref 8.7–10.2)
CHLORIDE SERPL-SCNC: 100 MMOL/L (ref 96–106)
CHOLEST SERPL-MCNC: 171 MG/DL (ref 100–199)
CO2 SERPL-SCNC: 23 MMOL/L (ref 20–29)
COLOR UR: YELLOW
CREAT SERPL-MCNC: 0.72 MG/DL (ref 0.57–1)
EOSINOPHIL # BLD AUTO: 0.1 X10E3/UL (ref 0–0.4)
EOSINOPHIL NFR BLD AUTO: 2 %
ERYTHROCYTE [DISTWIDTH] IN BLOOD BY AUTOMATED COUNT: 11.7 % (ref 12.3–15.4)
EST. AVERAGE GLUCOSE BLD GHB EST-MCNC: 94 MG/DL
GLOBULIN SER CALC-MCNC: 2.4 G/DL (ref 1.5–4.5)
GLUCOSE SERPL-MCNC: 86 MG/DL (ref 65–99)
GLUCOSE UR QL: NEGATIVE
HBA1C MFR BLD: 4.9 % (ref 4.8–5.6)
HCT VFR BLD AUTO: 40.8 % (ref 34–46.6)
HDLC SERPL-MCNC: 59 MG/DL
HGB BLD-MCNC: 13.9 G/DL (ref 11.1–15.9)
HGB UR QL STRIP: NEGATIVE
IMM GRANULOCYTES # BLD AUTO: 0 X10E3/UL (ref 0–0.1)
IMM GRANULOCYTES NFR BLD AUTO: 0 %
INTERPRETATION, 910389: NORMAL
IRON SATN MFR SERPL: 37 % (ref 15–55)
IRON SERPL-MCNC: 169 UG/DL (ref 27–159)
KETONES UR QL STRIP: NEGATIVE
LDLC SERPL CALC-MCNC: 97 MG/DL (ref 0–99)
LEUKOCYTE ESTERASE UR QL STRIP: NEGATIVE
LYMPHOCYTES # BLD AUTO: 1.6 X10E3/UL (ref 0.7–3.1)
LYMPHOCYTES NFR BLD AUTO: 30 %
MAGNESIUM SERPL-MCNC: 1.9 MG/DL (ref 1.6–2.3)
MCH RBC QN AUTO: 31.3 PG (ref 26.6–33)
MCHC RBC AUTO-ENTMCNC: 34.1 G/DL (ref 31.5–35.7)
MCV RBC AUTO: 92 FL (ref 79–97)
MICRO URNS: NORMAL
MONOCYTES # BLD AUTO: 0.3 X10E3/UL (ref 0.1–0.9)
MONOCYTES NFR BLD AUTO: 7 %
NEUTROPHILS # BLD AUTO: 3.2 X10E3/UL (ref 1.4–7)
NEUTROPHILS NFR BLD AUTO: 61 %
NITRITE UR QL STRIP: NEGATIVE
PH UR STRIP: 6.5 [PH] (ref 5–7.5)
PLATELET # BLD AUTO: 259 X10E3/UL (ref 150–450)
POTASSIUM SERPL-SCNC: 4.1 MMOL/L (ref 3.5–5.2)
PROT SERPL-MCNC: 6.9 G/DL (ref 6–8.5)
PROT UR QL STRIP: NEGATIVE
RBC # BLD AUTO: 4.44 X10E6/UL (ref 3.77–5.28)
SODIUM SERPL-SCNC: 138 MMOL/L (ref 134–144)
SP GR UR: 1.02 (ref 1–1.03)
TIBC SERPL-MCNC: 460 UG/DL (ref 250–450)
TRIGL SERPL-MCNC: 76 MG/DL (ref 0–149)
TSH SERPL DL<=0.005 MIU/L-ACNC: 1.18 UIU/ML (ref 0.45–4.5)
UIBC SERPL-MCNC: 291 UG/DL (ref 131–425)
UROBILINOGEN UR STRIP-MCNC: 0.2 MG/DL (ref 0.2–1)
VLDLC SERPL CALC-MCNC: 15 MG/DL (ref 5–40)
WBC # BLD AUTO: 5.3 X10E3/UL (ref 3.4–10.8)

## 2019-10-28 NOTE — PROGRESS NOTES
Labs looked good except iron slightly elevated. Please follow up in office. Thank you. Please print letter.